# Patient Record
(demographics unavailable — no encounter records)

---

## 2024-12-11 NOTE — PHYSICAL EXAM
[FreeTextEntry1] : Mental Status: Alert and Attentive, oriented to person, place, and time. Speech is fluent with intact naming, repetition, and comprehension, no dysarthria. Insight and judgment were intact and the affect was normal. Follows commands. Attention/concentration intact. Fund of knowledge appropriate.  Cranial Nerves: VFF to confrontation, EOMi w/o nystagmus, pupils equally rounds and reactive to light, V1-V3 intact bilaterally and symmetric, face is symmetric with normal eye closure and smile, hearing is bilaterally intact to finger rub, uvula is midline and soft palate rises symmetrically, tongue protrudes midline.  Motor: MRC grading 5/5 bilateral UE/LE.  strength 5/5. Normal tone and bulk. No pronator drift.   Sensory exam: Sensation intact to light touch bilaterally in all extremities.   Coordination: No dysmetria on finger-to-nose.  Gait: Deferred, in wheelchair (Walks a few steps at home)

## 2024-12-11 NOTE — REASON FOR VISIT
[[employer] ] : provided by [employer]   [Follow-Up: _____] : a [unfilled] follow-up visit [Family Member] : family member [Interpreters_IDNumber] : [id number] [Interpreters_FullName] : [place]

## 2024-12-11 NOTE — REASON FOR VISIT
[[employer] ] : provided by [employer]   [Follow-Up: _____] : a [unfilled] follow-up visit [Family Member] : family member [Interpreters_IDNumber] : [id number] [Interpreters_FullName] : [place]

## 2024-12-11 NOTE — ASSESSMENT
[FreeTextEntry1] : Mr. Garcia is a 79-year-old gentleman with PMH of Afib (on Eliquis) returning today for interval follow up of 40% stenosis of the R ICA and L chronic ICA occlusion seen on angiogram on 08/15.    Had 1 bleeding episode at fistula site when dialysis staff was attempting to access site on 11/14 in which he was sent to the ED. S/P fistulogram and 2 stitches to fistula site. No further bleeding episodes.   Given bleeding, will decrease Brilinta from 60 mg BID to 30 mg BID Continue Eliquis as prescribed Will obtain CUS in February to assess stenosis Advised to call back with any other complaints or concerns.

## 2024-12-11 NOTE — HISTORY OF PRESENT ILLNESS
[FreeTextEntry1] : Interval history: Mr. Garcia is a 79-year-old gentleman who presents today, accompanied by daughter, for interval follow up of his R carotid artery stenosis.   Patient feels well. Denies any new stroke-like symptoms. Daughter reports he had an episode of bleeding from fistula site on 11/14 when dialysis staff was trying to access site and was sent to ED. Per chart review, s/p fistulogram and received 2 stitches to fistula site, undergoing dialysis successfully. Daughter denies any further bleeding episodes.   Prior history: 08/24: Mr. Garcia is a 78-year-old gentleman h/o previous deep mca territory infarct, who underwent diagnostic cerebral angiogram on 8/15 via right femoral for further evaluation of right carotid artery stenosis with potential stenting and angioplasty. Diagnostic angiogram revealed 40% stenosis of the proximal portion of the right internal carotid artery, occlusion of the origin of the left internal carotid artery with intracranial flow maintained via the anterior communicating artery and left vertebral artery stenosis. Despite the contralateral occlusion, the stenosis of the right ICA was too mild to consider stent.  Patient reports he feels well since procedure and is tolerating medications w/o any issues. Endorses no complaints with right femoral catheterization site. Denies any new neurological complaints or concerns.

## 2025-02-13 NOTE — PHYSICAL EXAM
[Alert] : alert [No Respiratory Distress] : no respiratory distress [de-identified] : Palpable thrill overlying the left bicep.  No hematoma or evidence of cellulitis.  No digital ulcer or evidence of ischemia within the left hand.

## 2025-02-13 NOTE — HISTORY OF PRESENT ILLNESS
[FreeTextEntry1] : 79-year-old man with end-stage renal disease on maintenance hemodialysis via left upper extremity brachiocephalic AV fistula.  Patient presents today for evaluation of his AV access.  His daughter denies any issues with the AV access.. Focused ultrasound of the left upper extremity demonstrates a patent brachiocephalic AV fistula with no hemodynamically significant stenosis.  There is 950 mL/minute of flow measured.

## 2025-02-13 NOTE — ASSESSMENT
[FreeTextEntry1] : 79-year-old man with end-stage renal disease on maintenance hemodialysis via left brachiocephalic AV fistula, now presents for routine evaluation.  He denies any complaints with his AV access.  Ultrasound today demonstrates a patent AV fistula with 950 mL/minute of flow volume measured within the cephalic vein.  No hemodynamically significant stenosis identified.  Will perform an AV fistulogram in approximately 6 months.